# Patient Record
Sex: MALE | Race: WHITE | NOT HISPANIC OR LATINO | URBAN - METROPOLITAN AREA
[De-identification: names, ages, dates, MRNs, and addresses within clinical notes are randomized per-mention and may not be internally consistent; named-entity substitution may affect disease eponyms.]

---

## 2020-09-30 VITALS — HEIGHT: 67 IN

## 2020-10-16 ENCOUNTER — OFFICE (OUTPATIENT)
Dept: URBAN - METROPOLITAN AREA TELEHEALTH 10 | Facility: TELEHEALTH | Age: 65
End: 2020-10-16

## 2020-10-16 DIAGNOSIS — Z86.010 PERSONAL HISTORY OF COLONIC POLYPS: ICD-10-CM

## 2020-10-16 RX ORDER — POLYETHYLENE GLYCOL 3350, SODIUM SULFATE, SODIUM CHLORIDE, POTASSIUM CHLORIDE, ASCORBIC ACID, SODIUM ASCORBATE 7.5-2.691G
KIT ORAL
Qty: 1 | Refills: 0 | Status: ACTIVE
Start: 2020-10-16

## 2020-10-16 NOTE — SERVICEHPINOTES
64 yo WM with Telehealth visit to discuss a screening colonoscopy. he is asymptomatic and denies any significant interval history since his last colonoscopy 11/09/2015 found to adenomatous colon polyps. BRColonoscopy 8/10/13 found 13 adenomatous colon polyps ranging in size from 1 mm to 3 cm.  Colonoscopy 9/21/06 found a small rectal adenomatous polyp. Esophagram 11/6/02 was normal. EGD 12/9/02 found gastritis. Empiric esophageal dilatation was performed with an 18 mm Savary dilator.  He had a remote left nephrectomy.  He is on no GI medications at this time.  Family history is negative for colon polyps.  His mother had colon cancer in her 70s.

## 2025-08-11 ENCOUNTER — OFFICE (OUTPATIENT)
Dept: URBAN - METROPOLITAN AREA CLINIC 19 | Facility: CLINIC | Age: 70
End: 2025-08-11
Payer: COMMERCIAL

## 2025-08-11 VITALS
HEIGHT: 67 IN | SYSTOLIC BLOOD PRESSURE: 157 MMHG | DIASTOLIC BLOOD PRESSURE: 119 MMHG | HEART RATE: 75 BPM | DIASTOLIC BLOOD PRESSURE: 109 MMHG | WEIGHT: 163 LBS | SYSTOLIC BLOOD PRESSURE: 150 MMHG | OXYGEN SATURATION: 98 %

## 2025-08-11 DIAGNOSIS — Z86.0100 PERSONAL HISTORY OF COLON POLYPS, UNSPECIFIED: ICD-10-CM

## 2025-08-11 DIAGNOSIS — R19.4 CHANGE IN BOWEL HABIT: ICD-10-CM

## 2025-08-11 PROCEDURE — 99205 OFFICE O/P NEW HI 60 MIN: CPT | Performed by: INTERNAL MEDICINE

## 2025-08-11 RX ORDER — SODIUM SULFATE, POTASSIUM SULFATE, MAGNESIUM SULFATE 17.5; 3.13; 1.6 G/ML; G/ML; G/ML
SOLUTION, CONCENTRATE ORAL
Qty: 177 | Refills: 0 | Status: ACTIVE
Start: 2025-08-11